# Patient Record
Sex: MALE | Race: WHITE | Employment: UNEMPLOYED | URBAN - METROPOLITAN AREA
[De-identification: names, ages, dates, MRNs, and addresses within clinical notes are randomized per-mention and may not be internally consistent; named-entity substitution may affect disease eponyms.]

---

## 2024-01-09 ENCOUNTER — HOSPITAL ENCOUNTER (OUTPATIENT)
Age: 44
Discharge: HOME | End: 2024-01-09
Payer: COMMERCIAL

## 2024-01-09 VITALS
TEMPERATURE: 98 F | HEIGHT: 69 IN | OXYGEN SATURATION: 98 % | RESPIRATION RATE: 20 BRPM | DIASTOLIC BLOOD PRESSURE: 87 MMHG | BODY MASS INDEX: 38.51 KG/M2 | HEART RATE: 90 BPM | WEIGHT: 260 LBS | SYSTOLIC BLOOD PRESSURE: 156 MMHG

## 2024-01-09 DIAGNOSIS — J02.9 ACUTE VIRAL PHARYNGITIS: Primary | ICD-10-CM

## 2024-01-09 LAB — S PYO AG THROAT QL: NEGATIVE

## 2024-01-09 PROCEDURE — 87880 STREP A ASSAY W/OPTIC: CPT | Performed by: NURSE PRACTITIONER

## 2024-01-09 PROCEDURE — 99203 OFFICE O/P NEW LOW 30 MIN: CPT | Performed by: NURSE PRACTITIONER

## 2024-01-09 NOTE — ED PROVIDER NOTES
Patient Seen in: [facility]      History     Chief Complaint   Patient presents with    Sore Throat     Stated Complaint: Sore Throat    Subjective: This is a 43-year-old male, no significant past medical history, presents to immediate care with complaints of sore throat since Friday.  However, patient does note that symptoms have been improving over the past 48 hours.  He denies any coexisting fever, chills, headache, fatigue.  No current cough, congestion, runny nose, postnasal drip.  Patient believes he had COVID-19 2 weeks ago.  Patient has been taking Tylenol to alleviation of symptoms.  He denies any known strep, COVID, sick contacts.  However, patient does  children's basketball.  Patient well-appearing.  Speaking complete full sentences without difficulty.  AOx4.  The history is provided by the patient.           Objective:   Past Medical History:   Diagnosis Date    Obesity               Past Surgical History:   Procedure Laterality Date    HAND/FINGER SURGERY UNLISTED Right 2001    HAND/FINGER SURGERY UNLISTED Right 2006    middle finger    KNEE SURGERY Right 1991    UMBILICAL HERNIA REPAIR N/A 12/7/2016    Procedure: HERNIA UMBILICAL REPAIR ADULT;  Surgeon: Frankie Nettles MD;  Location: [facility] MAIN OR    VENTRAL HERNIA REPAIR N/A 12/07/2016    ncarcerated, with mesh                Social History     Socioeconomic History    Marital status:     Number of children: 1   Occupational History    Occupation:    Tobacco Use    Smoking status: Former     Packs/day: 1.00     Years: 10.00     Additional pack years: 0.00     Total pack years: 10.00     Types: Cigarettes    Tobacco comments:     stopped 2011   Substance and Sexual Activity    Alcohol use: Yes     Alcohol/week: 6.0 standard drinks of alcohol     Types: 6 Cans of beer per week              Review of Systems   Constitutional: Negative.    HENT:  Positive for sore throat. Negative for congestion, ear discharge, ear pain,  facial swelling, hearing loss, nosebleeds, rhinorrhea, sinus pressure, sinus pain and sneezing.    Eyes: Negative.    Respiratory: Negative.     Cardiovascular: Negative.    Gastrointestinal: Negative.    Genitourinary: Negative.    Musculoskeletal: Negative.    Skin: Negative.    Neurological: Negative.        Positive for stated complaint: Sore Throat  Other systems are as noted in HPI.  Constitutional and vital signs reviewed.      All other systems reviewed and negative except as noted above.    Physical Exam     ED Triage Vitals [01/09/24 0854]   /87   Pulse 90   Resp 20   Temp 98.3 °F (36.8 °C)   Temp src Oral   SpO2 98 %   O2 Device None (Room air)       Current:/87   Pulse 90   Temp 98.3 °F (36.8 °C) (Oral)   Resp 20   Ht 175.3 cm (5' 9\")   Wt 117.9 kg   SpO2 98%   BMI 38.40 kg/m²         Physical Exam  Constitutional:       General: He is not in acute distress.     Appearance: He is well-developed. He is not ill-appearing.   HENT:      Head: Normocephalic.      Right Ear: Tympanic membrane and ear canal normal.      Left Ear: Tympanic membrane and ear canal normal.      Nose: No congestion or rhinorrhea.      Mouth/Throat:      Mouth: Mucous membranes are moist. No oral lesions.      Pharynx: Uvula midline. Posterior oropharyngeal erythema present. No pharyngeal swelling, oropharyngeal exudate or uvula swelling.      Tonsils: No tonsillar exudate or tonsillar abscesses. 1+ on the right. 1+ on the left.   Eyes:      Conjunctiva/sclera: Conjunctivae normal.      Pupils: Pupils are equal, round, and reactive to light.   Cardiovascular:      Rate and Rhythm: Normal rate.      Heart sounds: Normal heart sounds.   Pulmonary:      Effort: Pulmonary effort is normal. No respiratory distress.      Breath sounds: Normal breath sounds. No stridor. No wheezing, rhonchi or rales.   Chest:      Chest wall: No tenderness.   Musculoskeletal:      Cervical back: Normal range of motion.   Lymphadenopathy:       Cervical: Cervical adenopathy present.   Skin:     General: Skin is warm.      Capillary Refill: Capillary refill takes less than 2 seconds.   Neurological:      General: No focal deficit present.      Mental Status: He is alert and oriented to person, place, and time.               ED Course     Labs Reviewed   POCT RAPID STREP - Normal                      MDM      This is a 43-year-old male, presents to immediate care clinic for evaluation of sore throat since Friday.  Symptoms are slowly improving.  No other coexisting viral symptoms.  Patient does believe he had COVID-19 2 weeks ago.    Patient posterior pharynx visualized with 1+ tonsillar enlargement, no exudate.  Positive erythema.  Uvula midline and normal in size.  Mucous membranes moist.  No intraoral lesions or petechiae.  Positive cervical lymphadenopathy 3.    Unlikely to be COVID-19 considering symptoms have been present for 4 to 5 days and are without other viral symptoms.    Patient likely has strep pharyngitis versus viral pharyngitis.    He is tolerating his own secretions well without difficulty.  Speaking in complete full sentences.  No wheezing, stridor, hot potato voice, excessive drooling.  Low suspicion for peritonsillar abscess.    Patient is negative for strep throat.    Did discuss the patient that symptoms are likely viral in nature.  He is aware supportive and symptomatic treatment at home.  Educated him on signs and symptoms above peritonsillar abscess that warrant further immediate follow-up and emergency room.                                    Medical Decision Making      Disposition and Plan     Clinical Impression:  1. Acute viral pharyngitis         Disposition:  Discharge  1/9/2024  9:08 am    Follow-up:  Asim Galdamez MD  [address] [facility] [address]  [phone]      As needed          Medications Prescribed:  Discharge Medication List as of 1/9/2024  9:09 AM

## 2024-01-09 NOTE — DISCHARGE INSTRUCTIONS
As discussed, you are negative for strep throat.  Symptoms likely viral in nature.  Continue to drink plenty of water and stay well-hydrated.  May take Tylenol and Motrin as needed for your throat discomfort.  As discussed, salt water gargles, warm tea with honey, Cepacol lozenges, Chloraseptic spray to soothe sore throat.    If you have any worsening throat pain that is one-sided, difficulty swallowing your own secretions, excessive drooling, stridor, hot potato/muffled voice, please go to ER

## 2025-02-08 ENCOUNTER — APPOINTMENT (OUTPATIENT)
Dept: GENERAL RADIOLOGY | Facility: HOSPITAL | Age: 45
End: 2025-02-08
Attending: EMERGENCY MEDICINE
Payer: COMMERCIAL

## 2025-02-08 ENCOUNTER — HOSPITAL ENCOUNTER (EMERGENCY)
Facility: HOSPITAL | Age: 45
Discharge: HOME | End: 2025-02-08
Attending: EMERGENCY MEDICINE
Payer: COMMERCIAL

## 2025-02-08 VITALS
HEART RATE: 70 BPM | BODY MASS INDEX: 40.73 KG/M2 | RESPIRATION RATE: 15 BRPM | TEMPERATURE: 99 F | DIASTOLIC BLOOD PRESSURE: 57 MMHG | HEIGHT: 69 IN | SYSTOLIC BLOOD PRESSURE: 111 MMHG | WEIGHT: 275 LBS | OXYGEN SATURATION: 97 %

## 2025-02-08 DIAGNOSIS — R00.2 PALPITATIONS: Primary | ICD-10-CM

## 2025-02-08 LAB
ANION GAP SERPL CALC-SCNC: 9 MMOL/L (ref 0–18)
ATRIAL RATE: 85 BPM
BASOPHILS # BLD AUTO: 0.03 X10(3) UL (ref 0–0.2)
BASOPHILS NFR BLD AUTO: 0.4 %
BUN BLD-MCNC: 15 MG/DL (ref 9–23)
BUN/CREAT SERPL: 14.9 (ref 10–20)
CALCIUM BLD-MCNC: 9.2 MG/DL (ref 8.7–10.4)
CHLORIDE SERPL-SCNC: 108 MMOL/L (ref 98–112)
CO2 SERPL-SCNC: 27 MMOL/L (ref 21–32)
CREAT BLD-MCNC: 1.01 MG/DL
D DIMER PPP FEU-MCNC: <0.27 UG/ML FEU (ref ?–0.5)
DEPRECATED RDW RBC AUTO: 41.8 FL (ref 35.1–46.3)
EGFRCR SERPLBLD CKD-EPI 2021: 95 ML/MIN/1.73M2 (ref 60–?)
EOSINOPHIL # BLD AUTO: 0.04 X10(3) UL (ref 0–0.7)
EOSINOPHIL NFR BLD AUTO: 0.5 %
ERYTHROCYTE [DISTWIDTH] IN BLOOD BY AUTOMATED COUNT: 12.9 % (ref 11–15)
GLUCOSE BLD-MCNC: 116 MG/DL (ref 70–99)
HCT VFR BLD AUTO: 43.5 %
HGB BLD-MCNC: 14.3 G/DL
IMM GRANULOCYTES # BLD AUTO: 0.02 X10(3) UL (ref 0–1)
IMM GRANULOCYTES NFR BLD: 0.3 %
LYMPHOCYTES # BLD AUTO: 2.24 X10(3) UL (ref 1–4)
LYMPHOCYTES NFR BLD AUTO: 28.7 %
MCH RBC QN AUTO: 29 PG (ref 26–34)
MCHC RBC AUTO-ENTMCNC: 32.9 G/DL (ref 31–37)
MCV RBC AUTO: 88.2 FL
MONOCYTES # BLD AUTO: 0.54 X10(3) UL (ref 0.1–1)
MONOCYTES NFR BLD AUTO: 6.9 %
NEUTROPHILS # BLD AUTO: 4.93 X10 (3) UL (ref 1.5–7.7)
NEUTROPHILS # BLD AUTO: 4.93 X10(3) UL (ref 1.5–7.7)
NEUTROPHILS NFR BLD AUTO: 63.2 %
OSMOLALITY SERPL CALC.SUM OF ELEC: 300 MOSM/KG (ref 275–295)
P AXIS: 65 DEGREES
P-R INTERVAL: 170 MS
PLATELET # BLD AUTO: 226 10(3)UL (ref 150–450)
POTASSIUM SERPL-SCNC: 4.1 MMOL/L (ref 3.5–5.1)
Q-T INTERVAL: 386 MS
QRS DURATION: 100 MS
QTC CALCULATION (BEZET): 459 MS
R AXIS: 52 DEGREES
RBC # BLD AUTO: 4.93 X10(6)UL
SODIUM SERPL-SCNC: 144 MMOL/L (ref 136–145)
T AXIS: 55 DEGREES
T3FREE SERPL-MCNC: 3.79 PG/ML (ref 2.4–4.2)
T4 FREE SERPL-MCNC: 1.4 NG/DL (ref 0.8–1.7)
TROPONIN I SERPL HS-MCNC: <3 NG/L
TSI SER-ACNC: 0.4 UIU/ML (ref 0.55–4.78)
VENTRICULAR RATE: 85 BPM
WBC # BLD AUTO: 7.8 X10(3) UL (ref 4–11)

## 2025-02-08 PROCEDURE — 93010 ELECTROCARDIOGRAM REPORT: CPT

## 2025-02-08 PROCEDURE — 85379 FIBRIN DEGRADATION QUANT: CPT | Performed by: EMERGENCY MEDICINE

## 2025-02-08 PROCEDURE — 99284 EMERGENCY DEPT VISIT MOD MDM: CPT

## 2025-02-08 PROCEDURE — 84484 ASSAY OF TROPONIN QUANT: CPT | Performed by: EMERGENCY MEDICINE

## 2025-02-08 PROCEDURE — 71045 X-RAY EXAM CHEST 1 VIEW: CPT | Performed by: EMERGENCY MEDICINE

## 2025-02-08 PROCEDURE — 36415 COLL VENOUS BLD VENIPUNCTURE: CPT

## 2025-02-08 PROCEDURE — 84443 ASSAY THYROID STIM HORMONE: CPT | Performed by: EMERGENCY MEDICINE

## 2025-02-08 PROCEDURE — 85025 COMPLETE CBC W/AUTO DIFF WBC: CPT | Performed by: EMERGENCY MEDICINE

## 2025-02-08 PROCEDURE — 93005 ELECTROCARDIOGRAM TRACING: CPT

## 2025-02-08 PROCEDURE — 80048 BASIC METABOLIC PNL TOTAL CA: CPT | Performed by: EMERGENCY MEDICINE

## 2025-02-08 PROCEDURE — 84481 FREE ASSAY (FT-3): CPT | Performed by: EMERGENCY MEDICINE

## 2025-02-08 PROCEDURE — 84439 ASSAY OF FREE THYROXINE: CPT | Performed by: EMERGENCY MEDICINE

## 2025-02-08 PROCEDURE — 99285 EMERGENCY DEPT VISIT HI MDM: CPT

## 2025-02-08 NOTE — DISCHARGE INSTRUCTIONS
Please make an appointment to follow-up with a cardiologist.  You may benefit from an event monitor or Holter monitor for further evaluation.    Return to the emergency department if you develop severe and persistent chest pain, difficulty breathing, dizziness, leg swelling, or if you are coughing up blood, as these can be signs of a medical emergency.  Please call your doctor for a follow-up appointment in 1 to 3 days to determine the need for further testing.

## 2025-02-08 NOTE — ED INITIAL ASSESSMENT (HPI)
Arrives d/t elevated HR and palpitations. Present x 1 day. Additionally c/o SOB throughout the day, currently not present. Denies any CP or cardiac hx. Denies further complaint related to today's symptoms.

## 2025-02-08 NOTE — ED PROVIDER NOTES
Patient Seen in: [facility] Emergency Department      History     Chief Complaint   Patient presents with    Arrythmia/Palpitations     Stated Complaint: palpitations    Subjective:   HPI  44-year-old male with history of umbilical hernia repair last month who presents for evaluation of heart palpitations.  Today morning when he woke up he felt that his heart was racing and it persisted for about an hour.  He reports heart rates about 120 bpm.  It caused some shortness of breath.  There is no chest pain.  No leg swelling.  No nausea or vomiting.  No fever or chills.  No cough or congestion.  No alcohol or drug use.  No history of VTE.  No recent travel.  He did have outpatient hernia repair last month.  Currently is asymptomatic.        Objective:     No pertinent past medical history.            No pertinent past surgical history.              No pertinent social history.                Physical Exam     ED Triage Vitals [02/08/25 0524]   /88   Pulse 80   Resp 18   Temp 98.6 °F (37 °C)   Temp src Oral   SpO2 94 %   O2 Device        Current Vitals:   Vital Signs  BP: 145/88  Pulse: 80  Resp: 18  Temp: 98.6 °F (37 °C)  Temp src: Oral    Oxygen Therapy  SpO2: 94 %        Physical Exam  Vitals and nursing note reviewed.   Constitutional:       Appearance: He is well-developed.   HENT:      Head: Normocephalic and atraumatic.      Mouth/Throat:      Mouth: Mucous membranes are moist.      Pharynx: Oropharynx is clear.   Eyes:      Extraocular Movements: Extraocular movements intact.   Cardiovascular:      Rate and Rhythm: Normal rate and regular rhythm.      Heart sounds: Normal heart sounds.      Comments: Bilateral radial pulses 2+  Pulmonary:      Effort: Pulmonary effort is normal.      Breath sounds: Normal breath sounds.   Abdominal:      General: There is no distension.      Palpations: Abdomen is soft.      Tenderness: There is no abdominal tenderness.   Musculoskeletal:         General: Normal  range of motion.      Cervical back: Normal range of motion.      Right lower leg: No edema.      Left lower leg: No edema.   Skin:     General: Skin is warm.      Capillary Refill: Capillary refill takes less than 2 seconds.   Neurological:      General: No focal deficit present.      Mental Status: He is alert.      Cranial Nerves: No cranial nerve deficit.      Sensory: No sensory deficit.      Motor: No weakness.      Coordination: Coordination normal.      Comments: No focal deficits       Differential diagnosis includes but is not limited to arrhythmia, electrolyte derangement, thyroid dysfunction, ACS/MI, PE    ED Course     Labs Reviewed   BASIC METABOLIC PANEL (8) - Abnormal; Notable for the following components:       Result Value    Glucose 116 (*)     Calculated Osmolality 300 (*)     All other components within normal limits   TSH W REFLEX TO FREE T4 - Abnormal; Notable for the following components:    TSH 0.403 (*)     All other components within normal limits   TROPONIN I HIGH SENSITIVITY - Normal   D-DIMER - Normal   T4, FREE (S) - Normal   CBC WITH DIFFERENTIAL WITH PLATELET   FREE T3 (TRIIODOTHYRONINE)     EKG    Rate, intervals and axes as noted on EKG Report.  Rate: 85  Rhythm: Sinus Rhythm  Reading: No STEMI, no ectopy                XR CHEST AP PORTABLE  (CPT=71045)    Result Date: 2/8/2025  CONCLUSION: No acute cardiopulmonary disease.    Dictated by (CST): Arturo Mckeon MD on 2/08/2025 at 8:33 AM     Finalized by (CST): Arturo Mckeon MD on 2/08/2025 at 8:34 AM                MDM              Medical Decision Making  Vitals are stable in the ED.  No arrhythmias noted on duration of telemetry monitoring throughout the ER stay.  No chest pain prior to arrival or throughout the ER stay.  BMP, CBC, troponin and D-dimer are within normal limits.  TSH slightly low but free T4 within normal range.  Per my independent interpretation of chest x-ray, no mediastinal widening or pulmonary edema.   On reevaluation he remained asymptomatic.  Advise follow-up with cardiology and discuss benefit of event verse Holter monitor for further evaluation.  Return precautions provided and discussed and he is comfortable with the plan.    Problems Addressed:  Palpitations: complicated acute illness or injury with systemic symptoms    Amount and/or Complexity of Data Reviewed  Labs: ordered. Decision-making details documented in ED Course.  Radiology: ordered and independent interpretation performed. Decision-making details documented in ED Course.  ECG/medicine tests: ordered and independent interpretation performed. Decision-making details documented in ED Course.        Disposition and Plan     Clinical Impression:  1. Palpitations         Disposition:  Discharge  2/8/2025  8:59 am    Follow-up:  Jony Shah MD  [address] [place] [id number] [phone]    Follow up            Medications Prescribed:  Current Discharge Medication List              Supplementary Documentation:

## 2025-02-08 NOTE — ED QUICK NOTES
Pt ambulatory to ED here for elevated heart rate that started this morning around 5 am after shower. States he has not felt his heart palpitate like this before  Has a record of his heart rate on his phone.  Pt not taking any medications, denies any CP/ recently diagnosed with high bp